# Patient Record
Sex: FEMALE | Race: WHITE
[De-identification: names, ages, dates, MRNs, and addresses within clinical notes are randomized per-mention and may not be internally consistent; named-entity substitution may affect disease eponyms.]

---

## 2019-03-12 ENCOUNTER — HOSPITAL ENCOUNTER (OUTPATIENT)
Dept: HOSPITAL 47 - WWCWWP | Age: 54
Discharge: HOME | End: 2019-03-12
Payer: COMMERCIAL

## 2019-03-12 VITALS
DIASTOLIC BLOOD PRESSURE: 72 MMHG | TEMPERATURE: 97.1 F | RESPIRATION RATE: 18 BRPM | HEART RATE: 81 BPM | SYSTOLIC BLOOD PRESSURE: 111 MMHG

## 2019-03-12 VITALS — BODY MASS INDEX: 21.4 KG/M2

## 2019-03-12 DIAGNOSIS — Z12.31: Primary | ICD-10-CM

## 2019-03-12 DIAGNOSIS — Z00.00: ICD-10-CM

## 2019-03-12 LAB
ALBUMIN SERPL-MCNC: 4.9 G/DL (ref 3.8–4.9)
ALBUMIN/GLOB SERPL: 1.96 G/DL (ref 1.6–3.17)
ALP SERPL-CCNC: 81 U/L (ref 41–126)
ALT SERPL-CCNC: 20 U/L (ref 8–44)
ANION GAP SERPL CALC-SCNC: 9.3 MMOL/L (ref 4–12)
AST SERPL-CCNC: 30 U/L (ref 13–35)
BUN SERPL-SCNC: 16 MG/DL (ref 9–27)
CALCIUM SPEC-MCNC: 9.7 MG/DL (ref 8.7–10.3)
CHLORIDE SERPL-SCNC: 103 MMOL/L (ref 96–109)
CHOLEST SERPL-MCNC: 188 MG/DL (ref 0–200)
CO2 SERPL-SCNC: 27.7 MMOL/L (ref 21.6–31.8)
ERYTHROCYTE [DISTWIDTH] IN BLOOD BY AUTOMATED COUNT: 4.64 M/UL (ref 3.8–5.4)
ERYTHROCYTE [DISTWIDTH] IN BLOOD: 11.9 % (ref 11.5–15.5)
GLOBULIN SER CALC-MCNC: 2.5 G/DL (ref 1.6–3.3)
GLUCOSE SERPL-MCNC: 77 MG/DL (ref 70–110)
HCT VFR BLD AUTO: 43.3 % (ref 34–46)
HGB BLD-MCNC: 13.8 GM/DL (ref 11.4–16)
MCH RBC QN AUTO: 29.7 PG (ref 25–35)
MCHC RBC AUTO-ENTMCNC: 31.9 G/DL (ref 31–37)
MCV RBC AUTO: 93.2 FL (ref 80–100)
PLATELET # BLD AUTO: 228 K/UL (ref 150–450)
POTASSIUM SERPL-SCNC: 4.1 MMOL/L (ref 3.5–5.5)
PROT SERPL-MCNC: 7.4 G/DL (ref 6.2–8.2)
SODIUM SERPL-SCNC: 140 MMOL/L (ref 135–145)
WBC # BLD AUTO: 6.4 K/UL (ref 3.8–10.6)

## 2019-03-12 PROCEDURE — 77067 SCR MAMMO BI INCL CAD: CPT

## 2019-03-12 PROCEDURE — 82465 ASSAY BLD/SERUM CHOLESTEROL: CPT

## 2019-03-12 PROCEDURE — 84443 ASSAY THYROID STIM HORMONE: CPT

## 2019-03-12 PROCEDURE — 36415 COLL VENOUS BLD VENIPUNCTURE: CPT

## 2019-03-12 PROCEDURE — 85027 COMPLETE CBC AUTOMATED: CPT

## 2019-03-12 PROCEDURE — 80053 COMPREHEN METABOLIC PANEL: CPT

## 2019-03-12 PROCEDURE — 84439 ASSAY OF FREE THYROXINE: CPT

## 2019-03-12 NOTE — P.HPOB
History of Present Illness


H&P Date: 19


Chief Complaint: The patient is here for her routine gynecologic exam.


This is a 54-year-old  with an LMP of 2017.  The patient is 

here to establish with this office.  Her  is  status post vasectomy.  The

patient states that has been about 10 years since her last pelvic exam.  She 

does have occasional hot flashes which seem to come and go.  They are not very 

bothersome.  She states that drinking dot tea seems to help.  She is without 

gynecologic complaints.  She currently does not have a primary care physician 

and is requesting screening blood work.








Review of Systems


She believes she has gained about 6 pounds over the last year.  She previously 

was a runner but has not been able to do this because of back issues.  She 

believes this is why she has gained some weight.  She denies respiratory, 

cardiac, or G.I. problems.








Past Medical History


Additional Past Medical History / Comment(s): Chronic back issues including 

herniated and bulging discs. PAST GYN HISTORY: She has no history of STDs.


History of Any Multi-Drug Resistant Organisms: None Reported


Past Surgical History: Adenoidectomy, Breast Surgery


Additional Past Surgical History / Comment(s): Benign breast lump removed.  

[place] teeth removed.  VTP.  Colonoscopy .


Past Psychological History: No Psychological Hx Reported


Smoking Status: Never smoker


Past Alcohol Use History: Occasional (4 per month)


Past Drug Use History: None Reported


Additional History: She is been  since .  She works in the probation 

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





- Past Family History


  ** Brother(s)


Family Medical History: Cancer


Additional Family Medical History / Comment(s): Prostate cancer.





  ** Sister(s)


Family Medical History: Cancer


Additional Family Medical History / Comment(s): Non-Hodgkin's lymphoma.





  ** Father


Family Medical History: Myocardial Infarction (MI)





  ** Mother


Family Medical History: No Reported History


Additional Family Medical History / Comment(s): Maternal grandmother had breast 

cancer.





Medications and Allergies


                                Home Medications











 Medication  Instructions  Recorded  Confirmed  Type


 


Cholecalciferol (Vitamin D3)  19  History





[Vitamin D3]    


 


Multivitamin [Multivitamins Adult 1 each PO 19  History





Gummies]    








                                    Allergies











Allergy/AdvReac Type Severity Reaction Status Date / Time


 


Anesthetics - Amide Type AdvReac  Vomiting Unverified 19 08:51














Exam


                                   Vital Signs











  Temp Pulse Resp BP Pulse Ox


 


 19 08:45  97.1 F L  81  18  111/72  99








                                Intake and Output











 19





 22:59 06:59 14:59


 


Other:   


 


  Weight   56.699 kg











Height 5'4", weight 125 pounds, BMI 21.5.





This is a well-developed well-nourished white female who is alert and oriented 

times 3 in no acute distress.


HEENT: Within normal limits.


NECK: Supple without mass or thyromegaly.


CHEST AND LUNGS: Clear to auscultation.


HEART: Regular rate and rhythm.


BREASTS: Are without mass or discharge.


AXILLARY EXAM: Negative for adenopathy.


BACK: Negative for CVA tenderness.


ABDOMEN: Soft, nontender, without palpable masses.


PELVIC EXAM: Normal external genitalia. Cervix and vagina appear normal with 

mild atrophy.  The service is nulliparous and slightly stenotic.  There is no 

unusual discharge.  There is no evidence of prolapse.  The uterus is 

midposition, nongravid size and nontender. There are no palpable adnexal masses 

or tenderness.


RECTAL EXAM: rectovaginal exam is negative for mass or tenderness and is 

negative for occult blood.


EXTREMITIES: Nontender.





IMPRESSION: 


1.  54-year-old menopausal female with normal gynecologic exam.





PLAN: 


1.  Pap smear was performed.


2.  Self breast awareness was discussed with the patient.


3.  Screening mammogram will be done today.  I have recommended that she do this

yearly.


4.  Osteoporosis prevention was discussed.  I have stressed the importance of 

adequate calcium, vitamin D and regular exercise.  Recommended amounts of 

calcium and vitamin D were also discussed.


5.  The patient is requesting screening blood tests.  The following will be 

drawn today: TSH, comprehensive chem panel, cholesterol, and CBC.  I have 

recommended that she established with a primary care physician.  She understands

that if there are abnormalities in today's blood work, I may suggest that she 

follow up with a primary care physician.


6.  I have recommended screening colonoscopy since it is been more than 10 years

since her last one.  She previously saw Dr. Morris for this.  I have recommended 

that she contact Dr. Morris's office for the screening colonoscopy.


7.  She was advised to return in one year for her annual well woman exam.

## 2019-03-13 NOTE — MM
Reason for exam: screening  (asymptomatic).

Last mammogram was performed 5 years ago.



History:

Patient is postmenopausal and is nulliparous.

Family history of breast cancer in maternal grandmother at age 45.

Benign excisional biopsy of the right breast, 2008.

Took hormonal contraceptives for 20 years beginning at age 18.



Physical Findings:

A clinical breast exam by your physician is recommended on an annual basis and 

results should be correlated with mammographic findings.



MG Screening Mammo w CAD

Bilateral CC and MLO view(s) were taken.

Prior study comparison: February 28, 2014, bilateral digital screening mammo 

w/CAD.  September 30, 2011, CAD bilateral diagnostic mammogram.

The breast tissue is extremely dense which could obscure a lesion on mammography. 

There are benign appearing round calcifications bilaterally. There is no discrete

abnormality.





ASSESSMENT: Benign, BI-RAD 2



RECOMMENDATION:

Routine screening mammogram of both breasts in 1 year.

## 2019-06-10 ENCOUNTER — HOSPITAL ENCOUNTER (OUTPATIENT)
Dept: HOSPITAL 47 - LABWHC1 | Age: 54
Discharge: HOME | End: 2019-06-10
Attending: INTERNAL MEDICINE
Payer: COMMERCIAL

## 2019-06-10 DIAGNOSIS — E03.8: Primary | ICD-10-CM

## 2019-06-10 LAB — T4 FREE SERPL-MCNC: 1.1 NG/DL (ref 0.8–1.8)

## 2019-06-10 PROCEDURE — 36415 COLL VENOUS BLD VENIPUNCTURE: CPT

## 2019-06-10 PROCEDURE — 86376 MICROSOMAL ANTIBODY EACH: CPT

## 2019-06-10 PROCEDURE — 84443 ASSAY THYROID STIM HORMONE: CPT

## 2019-06-10 PROCEDURE — 84439 ASSAY OF FREE THYROXINE: CPT

## 2019-07-22 ENCOUNTER — HOSPITAL ENCOUNTER (OUTPATIENT)
Dept: HOSPITAL 47 - LABWHC1 | Age: 54
Discharge: HOME | End: 2019-07-22
Attending: INTERNAL MEDICINE
Payer: COMMERCIAL

## 2019-07-22 DIAGNOSIS — E03.8: Primary | ICD-10-CM

## 2019-07-22 PROCEDURE — 36415 COLL VENOUS BLD VENIPUNCTURE: CPT

## 2019-07-22 PROCEDURE — 84443 ASSAY THYROID STIM HORMONE: CPT

## 2020-01-21 ENCOUNTER — HOSPITAL ENCOUNTER (OUTPATIENT)
Dept: HOSPITAL 47 - LABWHC1 | Age: 55
Discharge: HOME | End: 2020-01-21
Attending: INTERNAL MEDICINE
Payer: COMMERCIAL

## 2020-01-21 DIAGNOSIS — E03.8: Primary | ICD-10-CM

## 2020-01-21 PROCEDURE — 84443 ASSAY THYROID STIM HORMONE: CPT

## 2020-01-21 PROCEDURE — 36415 COLL VENOUS BLD VENIPUNCTURE: CPT

## 2020-03-03 ENCOUNTER — HOSPITAL ENCOUNTER (OUTPATIENT)
Dept: HOSPITAL 47 - RADMRIMAIN | Age: 55
Discharge: HOME | End: 2020-03-03
Attending: OTOLARYNGOLOGY
Payer: COMMERCIAL

## 2020-03-03 DIAGNOSIS — H90.42: Primary | ICD-10-CM

## 2020-03-03 DIAGNOSIS — H93.12: ICD-10-CM

## 2020-03-03 DIAGNOSIS — H92.02: ICD-10-CM

## 2020-03-03 DIAGNOSIS — H93.3X9: ICD-10-CM

## 2020-03-03 DIAGNOSIS — R42: ICD-10-CM

## 2020-03-03 PROCEDURE — 70553 MRI BRAIN STEM W/O & W/DYE: CPT

## 2020-03-03 NOTE — MR
EXAMINATION TYPE: MR brain and iac wo/w con

 

DATE OF EXAM: 3/3/2020

 

COMPARISON: None

 

HISTORY: Tinnitus

 

TECHNIQUE: 

Multiplanar, multisequence images of the brain and brainstem is performed without and with IV contras
t, utilizing 5.5 mL intravenous Gadavist .

 

FINDINGS: Diffusion weighted images demonstrate no evidence of a recent infarct or other diffusion ab
normality.  

 

Midline structures demonstrate normal morphology.  The craniocervical junction appears within normal 
limits.  Post contrast images demonstrate no abnormal enhancement. The dural venous sinuses appear pa
tent.

 

Mastoid air cells are clear. Nasal septal deviation seen with no significant changes of sinusitis. Or
bital structures are symmetric. Prominent blood vessel seen along the bob on the left. There is cont
iguous with the posterior cerebral artery and may empty into the dural venous sinus.

 

IMPRESSION: 

1. No evidence of cerebellopontine angle mass or acoustic schwannoma.

2. CTA [place] with contrast recommended to exclude tiny dural arteriovenous fistula

## 2020-03-18 ENCOUNTER — HOSPITAL ENCOUNTER (OUTPATIENT)
Dept: HOSPITAL 47 - RADCTMAIN | Age: 55
Discharge: HOME | End: 2020-03-18
Attending: OTOLARYNGOLOGY
Payer: COMMERCIAL

## 2020-03-18 DIAGNOSIS — H90.42: ICD-10-CM

## 2020-03-18 DIAGNOSIS — I77.0: ICD-10-CM

## 2020-03-18 DIAGNOSIS — H92.02: Primary | ICD-10-CM

## 2020-03-18 DIAGNOSIS — H81.11: ICD-10-CM

## 2020-03-18 PROCEDURE — 70496 CT ANGIOGRAPHY HEAD: CPT

## 2020-03-18 NOTE — CT
EXAMINATION TYPE: CT angio COW [place]

 

DATE OF EXAM: 3/18/2020 9:57 AM

 

COMPARISON: MRI brain March 3, 2020.

 

HISTORY: Hearing Loss, Otalgia

 

CT DLP: 1234.2 mGycm

Automated exposure control for dose reduction was used.

 

TECHNIQUE: 

Performed without and with IV Contrast, patient injected with 100 mL of Isovue 370.

3D reconstructed images are created on an independent workstation and reviewed.. 

 

FINDINGS: 

Noncontrast CT shows no acute intracranial hemorrhage or midline shift. Ventricles and sulci are with
in normal limits in size. Gray-white matter differentiation is maintained. Visualized sinuses are m[place]
ar and the globes are intact bilaterally. No suspicious opacification mastoid air cells.

 

There is dominant right vertebral artery. Vertebral arteries are patent basilar junction. There are p
atent bilateral posterior communicating arteries. There is no significant focal stenosis or aneurysma
l change of the posterior circulation. Asymmetric prominent left sided peripontine vessel on MRI show
s symmetric appearance on today's study with symmetric smaller branching vessels identified bilateral
ly. Findings presumed benign.

 

There is patent anterior communicating artery. There is no significant focal stenosis or aneurysmal c
hange in the anterior circulation.

 

IMPRESSION: Unremarkable study.

## 2022-07-13 ENCOUNTER — HOSPITAL ENCOUNTER (OUTPATIENT)
Dept: HOSPITAL 47 - WWCWWP | Age: 57
End: 2022-07-13
Attending: OBSTETRICS & GYNECOLOGY
Payer: COMMERCIAL

## 2022-07-13 VITALS
DIASTOLIC BLOOD PRESSURE: 80 MMHG | SYSTOLIC BLOOD PRESSURE: 114 MMHG | HEART RATE: 69 BPM | TEMPERATURE: 97.9 F | RESPIRATION RATE: 17 BRPM

## 2022-07-13 DIAGNOSIS — N94.10: ICD-10-CM

## 2022-07-13 DIAGNOSIS — E03.9: ICD-10-CM

## 2022-07-13 DIAGNOSIS — N93.9: ICD-10-CM

## 2022-07-13 DIAGNOSIS — Z79.890: ICD-10-CM

## 2022-07-13 DIAGNOSIS — Z12.31: ICD-10-CM

## 2022-07-13 DIAGNOSIS — Z78.0: ICD-10-CM

## 2022-07-13 DIAGNOSIS — Z88.4: ICD-10-CM

## 2022-07-13 DIAGNOSIS — Z01.419: Primary | ICD-10-CM

## 2022-07-13 PROCEDURE — 77063 BREAST TOMOSYNTHESIS BI: CPT

## 2022-07-13 PROCEDURE — 77067 SCR MAMMO BI INCL CAD: CPT

## 2022-07-13 NOTE — P.HPOB
History of Present Illness


H&P Date: 22


Chief Complaint: The patient is here for her routine gynecologic exam and ma

mmogram.





This is a 57-year-old  with an LMP of 2017.  The patient states she has 

been experiencing significant discomfort with sexual intercourse.  She states it

feels very dry and sometimes feels like there are sharp knives in her vagina.  

She had an episode where she had some bleeding after intercourse several weeks 

ago.  She has tried over-the-counter lubricants without much improvement.  She 

is otherwise without complaints.





Review of Systems





The patient has lost 13 pounds over the last 3 years.  She attributes the weight

loss to stress.  She denies respiratory, cardiac, or G.I. problems.





Past Medical History


Past Medical History: Thyroid Disorder


Additional Past Medical History / Comment(s): Hypothyroidism.  Chronic back 

issues including herniated and bulging discs. PAST GYN HISTORY: She has no 

history of STDs.


History of Any Multi-Drug Resistant Organisms: None Reported


Past Surgical History: Adenoidectomy, Breast Surgery


Additional Past Surgical History / Comment(s): Benign breast lump removed.  

[place] teeth removed.  VTP.  Colonoscopy .


Past Psychological History: No Psychological Hx Reported


Smoking Status: Never smoker


Past Alcohol Use History: Occasional (0-4 per month)


Past Drug Use History: None Reported


Additional History: She has been  since .  She works in the probation

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





- Past Family History


  ** Brother(s)


Family Medical History: Cancer


Additional Family Medical History / Comment(s): Prostate cancer.





  ** Sister(s)


Family Medical History: Cancer


Additional Family Medical History / Comment(s): Non-Hodgkin's lymphoma.





  ** Father


Family Medical History: Myocardial Infarction (MI)





  ** Mother


Family Medical History: No Reported History


Additional Family Medical History / Comment(s): Maternal grandmother had breast 

cancer.





Medications and Allergies


                                Home Medications











 Medication  Instructions  Recorded  Confirmed  Type


 


Cholecalciferol (Vitamin D3) 1 tab PO DAILY 19 History





[Vitamin D3]    


 


Multivitamin [Multivitamins Adult 1 each PO DAILY 19 History





Gummies]    


 


Thyroid, Pork [[place] Thyroid] 30 mg PO DAILY 22 History








                                    Allergies











Allergy/AdvReac Type Severity Reaction Status Date / Time


 


Anesthetics - Amide Type - AdvReac  Vomiting Unverified 22 11:32





Select A     





[Anesthetics - Amide Type]     














Exam


                                   Vital Signs











  Temp Pulse Resp BP Pulse Ox


 


 22 11:33  97.9 F  69  17  114/80  100








                                Intake and Output











 22





 22:59 06:59 14:59


 


Other:   


 


  Weight   50.802 kg














Height 5 feet 5 inches, weight 112 pounds, BMI 18.6.





This is a well-developed well-nourished white female who is alert and oriented 

times 3 in no acute distress.


HEENT: Within normal limits.


NECK: Supple without mass or thyromegaly.


CHEST AND LUNGS: Clear to auscultation.


HEART: Regular rate and rhythm.


BREASTS: Are without mass or discharge.


AXILLARY EXAM: Negative for adenopathy.


BACK: Negative for CVA tenderness.


ABDOMEN: Soft, nontender, without palpable masses.


PELVIC EXAM: Normal external genitalia with mild atrophy. Cervix and vagina 

appear normal with mild to moderate atrophy.  The cervix appears nulliparous and

is somewhat stenotic secondary to atrophy. There is no unusual discharge.  There

is no evidence of prolapse.  The uterus is midposition, nongravid size and nonte

nder. There are no palpable adnexal masses or tenderness.


RECTAL EXAM: Rectovaginal exam is negative for mass or tenderness and is 

negative for occult blood.


EXTREMITIES: Nontender.





IMPRESSION: 


1.  57-year-old menopausal female with normal gynecologic exam.


2.  Dyspareunia secondary to vaginal dryness and genital atrophy.


3.  Episode of postcoital vaginal bleeding probably secondary to #2.  Post

menopausal uterine bleeding is less likely.





PLAN: 


1.  Pap smear cotest was performed.


2.  Self breast awareness was discussed with the patient.  We have also 

discussed symptoms associated with inflammatory breast cancer.


3.  Screening mammogram will be done today.


4.  I have recommended pelvic ultrasound to evaluate endometrial thickness to 

rule out endometrial neoplasia.  If endometrial thickness is normal, I will 

prescribe estradiol vaginal cream to see if this helps with her dyspareunia and 

vaginal dryness.


5.  Osteoporosis prevention was discussed.  I have stressed the importance of 

adequate calcium, vitamin D and regular exercise.  Recommended amounts of 

calcium and vitamin D were also discussed.  She states her mother has 

osteoporosis so I have recommended starting bone density testing prior to age 

60.  She would like to wait until next year and do it at the time of her annual 

well woman visit.


6.  She is due for a colonoscopy since it has been more than 10 years since her 

last one.  She will arrange this through her PCP.


7. She was advised to return in one year for her annual well woman exam and as 

needed.

## 2022-07-15 NOTE — MM
Reason for Exam: Screening  (asymptomatic). 

Last mammogram was performed 3 year(s) and 4 month(s) ago. 





Patient History: 

Menarche at age 14. Patient has no children. Postmenopausal. Hormonal Contraceptives for 20 years

from age 18 until age 38. 2008, Benign Excisional Biopsy on the right side.

Maternal grandmother had breast cancer, age 45. 





Risk Values: 

Zohra 5 year model risk: 1.6%.

NCI Lifetime model risk: 10.4%.





Prior Study Comparison: 

9/30/2011 Bilateral Diagnostic Mammogram, [facility]. 2/28/2014 Bilateral Screening Mammogram, [facility].

3/12/2019 Bilateral Screening Mammogram, [facility]. 





Tissue Density: 

The breast tissue is extremely dense which could obscure a lesion on mammography.





Findings: 

Analyzed By CAD. 

There is no suspicious group of microcalcifications or new suspicious mass in either breast.

Benign-appearing scattered round calcifications. 





Overall Assessment: Benign, BI-RAD 2





Management: 

Screening Mammogram of both breasts in 1 year.

A clinical breast exam by your physician is recommended on an annual basis and results should be

correlated with mammographic findings.



Electronically signed and approved by: Chu Jaramillo D.O.

## 2022-07-27 ENCOUNTER — HOSPITAL ENCOUNTER (OUTPATIENT)
Dept: HOSPITAL 47 - RADUSWWP | Age: 57
Discharge: HOME | End: 2022-07-27
Attending: OBSTETRICS & GYNECOLOGY
Payer: COMMERCIAL

## 2022-07-27 DIAGNOSIS — N95.0: Primary | ICD-10-CM

## 2022-07-27 PROCEDURE — 76830 TRANSVAGINAL US NON-OB: CPT

## 2022-07-27 NOTE — US
EXAMINATION TYPE: US transvaginal

 

DATE OF EXAM: 7/27/2022

 

COMPARISON: NONE

 

CLINICAL HISTORY: N95.0 Postmenopausal bleeding. spotting after intercourse for a few months

 

TECHNIQUE:  TVTransvaginal sonographic images 

Date of LMP:  6yrs ago

 

EXAM MEASUREMENTS:

 

Uterus:  6.2 x 3.5 x 2.5 cm

Endometrial Stripe: 0.3 cm

Right Ovary:  not seen 

Left Ovary:  1.6 x 1.0 x 1.2 cm

 

 

 

1. Uterus:  Anteverted   filled collection with debris seen within cervical canal

2. Endometrium:  wnl

3. Right Ovary:  not seen due to peristalsing bowel and or atrophy 

4. Left Ovary:  wnl

5. Bilateral Adnexa:  wnl

6. Posterior cul-de-sac:  wnl

 

Urinary bladder is visualized appears sonolucent.

 

IMPRESSION: 

1. Some mild fluid debris may be within the cervical endometrial canal. Differential diagnosis includ
e hemorrhage.

2. Nonvisualization of the right ovary

## 2023-07-06 ENCOUNTER — HOSPITAL ENCOUNTER (OUTPATIENT)
Dept: HOSPITAL 47 - RADUSWWP | Age: 58
Discharge: HOME | End: 2023-07-06
Attending: FAMILY MEDICINE
Payer: COMMERCIAL

## 2023-07-06 DIAGNOSIS — E04.1: Primary | ICD-10-CM

## 2023-07-06 PROCEDURE — 76536 US EXAM OF HEAD AND NECK: CPT

## 2023-07-06 NOTE — US
EXAMINATION TYPE: US thyroid st tissue head/neck

 

DATE OF EXAM: 7/6/2023

 

COMPARISON: NONE

 

CLINICAL INDICATION: Female, 58 years old with history of E04.1 NONTOXIC SINGLE THYROID NODULE; Patie
nt states doctor felt a nodule.

 

GLAND SIZE:

 

Right Lobe: 4.5 x 1.4 x 1.4 cm

** Overall Parenchyma:  homogenous

Left Lobe: 2.8 x 0.8 x 0.8 cm

** Overall Parenchyma:  homogeneous

Isthmus Thickness:  0.1 cm

 

NODULES

 

RIGHT:   # of nodules measured on right: 0

 

LEFT:    # of nodules measured on left:  0

 

ISTHMUS:    # of nodules measured in the isthmus:  0

 

Bilateral neck scanned, no evidence of lymphadenopathy.

 

 

 

IMPRESSION:

Unremarkable thyroid ultrasound without ultrasound evidence for discrete nodule.

## 2023-07-26 ENCOUNTER — HOSPITAL ENCOUNTER (OUTPATIENT)
Dept: HOSPITAL 47 - RADMAMWWP | Age: 58
Discharge: HOME | End: 2023-07-26
Attending: FAMILY MEDICINE
Payer: COMMERCIAL

## 2023-07-26 DIAGNOSIS — Z80.3: ICD-10-CM

## 2023-07-26 DIAGNOSIS — Z78.0: ICD-10-CM

## 2023-07-26 DIAGNOSIS — M85.89: ICD-10-CM

## 2023-07-26 DIAGNOSIS — Z12.31: Primary | ICD-10-CM

## 2023-07-26 PROCEDURE — 77080 DXA BONE DENSITY AXIAL: CPT

## 2023-07-26 PROCEDURE — 77067 SCR MAMMO BI INCL CAD: CPT

## 2023-07-26 PROCEDURE — 77063 BREAST TOMOSYNTHESIS BI: CPT

## 2023-07-26 NOTE — BD
EXAMINATION TYPE: Axial Bone Density

 

DATE OF EXAM: 2023

 

CLINICAL HISTORY: 58 years old Female.  ICD-10 CODE:  N95.1 DISORDER OF BONE

 

Height:  64 in

Weight:  111 lbs

 

 

 

RISK FACTORS 

HISTORY OF: 

Family History of Osteoporosis: yes mother

Active: yes

Diet low in dairy products/other sources of calcium:  yes

Postmenopausal woman: age 51

 

MEDICATIONS: 

Thyroid Medications:  yes

Which medication: Synthroid

How Lon month

Additional Medications: calcium, vit d, vit b,  

 

 

 

EXAM MEASUREMENTS: 

Bone mineral densitometry was performed using the Viroblock System.

Bone mineral density as measured about the Lumbar spine is:  

----- L1-L4(G/cm2): 0.982

T Score Values are as follows:

----- L1: -1.2

----- L2: -1.5

----- L3: -1.2

----- L4: -2.6

----- L1-L4: -1.7

 

Z Score Values are as follows:

----- L1: 0.3

----- L2: 0.0

----- L3: 0.3

----- L4: -1.1

----- L1-L4: -0.2

 

Bone mineral density baseline

 

Bone mineral density about the R hip (g/cm2): 0.908

Bone mineral density about the L hip (g/cm2): 0.874

T Score values are as follows:

-----R Neck: -0.9

-----L Neck: -1.1

-----R Total: -0.8

-----L Total: -1.1

 

Z Score values are as follows:

-----R Neck: 0.5

-----L Neck: 0.4

-----R Total: 0.3

-----L Total: 0.1

 

Bone mineral density baseline

 

 

FRAX%s: The graph provided illustrates a 5.5% chance for a major osteoporotic fx and a 0.3% chance fo
r the hips probability for fx in 10 years time.

 

 

 

 

IMPRESSION:

Osteopenia (T Score between -2.5 and -1).

 

There is slightly increased risk of fracture and the patient may be considered 

for treatment. 

 

Re-Screen 2-5 years.

 

NOTE:  T-SCORE=SD OF THE YOUNG ADULT MEAN.

## 2023-07-27 NOTE — MM
Reason for Exam: Screening  (asymptomatic). 

Last screening mammogram was performed 12 month(s) ago.





Patient History: 

Menarche at age 14. Patient has no children. Postmenopausal. Hormonal Contraceptives for 20 years

from age 18 until age 38. 2008, Benign Excisional Biopsy on the right side.

Maternal grandmother had breast cancer, age 45. 





Risk Values: 

Zohra 5 year model risk: 1.7%.

NCI Lifetime model risk: 10.2%.





Prior Study Comparison: 

2/28/2014 Bilateral Screening Mammogram, [facility]. 3/12/2019 Bilateral Screening Mammogram, [facility].

7/13/2022 Bilateral MG 3D screening mammo w/cad, [facility]. 





Tissue Density: 

The breast tissue is heterogeneously dense. This may lower the sensitivity of mammography.





Findings: 

Analyzed By CAD. 

Unchanged regional microcalcifications posterior upper outer quadrant left breast.

There is no suspicious group of microcalcifications or new suspicious mass in either breast. 





Overall Assessment: Benign, BI-RAD 2





Management: 

Screening Mammogram of both breasts in 1 year.

.



Patient should continue monthly self-breast exams.  A clinical breast exam by your physician is

recommended on an annual basis.

This exam should not preclude additional follow-up of suspicious palpable abnormalities.



Note on Zohra scores and lifetime risk:

1. A Zohra score greater than 3% is considered moderate risk. If this is the case, consider

specialist referral to assess eligibility for a risk reducing agent.

2. If overall lifetime risk for the development of breast cancer is 20% or higher, the patient may

qualify for future screening with alternating mammogram and breast MRI.



Electronically signed and approved by: Ash Rene DO

## 2023-10-17 ENCOUNTER — HOSPITAL ENCOUNTER (OUTPATIENT)
Dept: HOSPITAL 47 - ORWHC2ENDO | Age: 58
Discharge: HOME | End: 2023-10-17
Attending: SURGERY
Payer: COMMERCIAL

## 2023-10-17 VITALS — TEMPERATURE: 97.6 F

## 2023-10-17 VITALS — BODY MASS INDEX: 20.2 KG/M2

## 2023-10-17 VITALS — HEART RATE: 72 BPM | DIASTOLIC BLOOD PRESSURE: 73 MMHG | SYSTOLIC BLOOD PRESSURE: 128 MMHG

## 2023-10-17 VITALS — RESPIRATION RATE: 16 BRPM

## 2023-10-17 DIAGNOSIS — Z79.890: ICD-10-CM

## 2023-10-17 DIAGNOSIS — E07.9: ICD-10-CM

## 2023-10-17 DIAGNOSIS — Z12.11: Primary | ICD-10-CM

## 2023-10-17 DIAGNOSIS — K63.5: ICD-10-CM

## 2023-10-17 DIAGNOSIS — Z98.890: ICD-10-CM

## 2023-10-17 DIAGNOSIS — Z88.4: ICD-10-CM

## 2023-10-17 PROCEDURE — 45385 COLONOSCOPY W/LESION REMOVAL: CPT

## 2023-10-17 PROCEDURE — 88305 TISSUE EXAM BY PATHOLOGIST: CPT

## 2023-10-17 NOTE — P.PCN
Date of Procedure: 10/17/23


Procedure(s) Performed: 


PREOPERATIVE DIAGNOSIS: Colon cancer screening


POSTOPERATIVE DIAGNOSIS: Small colon polyps


PROCEDURE: Colonoscopy with snare polypectomy


ANESTHESIA: MAC


SURGEON: Dylan Morris M.D.


SPECIMENS: Cecal polyp


ENDOSCOPIC PROCEDURE:  The patient was placed on the endoscopy table in the left

decubitus position.  The Olympus colonoscope was inserted into the anus and 

passed under direct visualization to the base of the cecum.  The appendiceal 

orifice was visualized.  From that point the scope was slowly withdrawn 

inspecting all surfaces carefully.  There was a small polyp seen on the cecal 

valve.  This was removed using the snare without cautery technique.  The 

remainder of the ascending transverse colon appeared normal.  In the descending 

colon another small polyp was seen and removed in a similar fashion.  This was 

so small that no specimen was identified.  The remainder of the descending 

sigmoid and rectum appeared normal.  There was no visible diverticulosis.  

Digital rectal examination was normal.  The patient was taken to the recovery 

room in stable condition per anesthesia guidelines.


RECOMMENDATIONS: Await biopsy results.  Repeat colonoscopy in 5-7 years.

## 2023-10-17 NOTE — P.GSHP
History of Present Illness


H&P Date: 10/17/23


Chief Complaint: Colon cancer screening





58-year-old female here for colonoscopy.  Last colonoscopy 15 years ago.  No 

bowel complaints.  No family history of colon cancer.





Past Medical History


Past Medical History: Thyroid Disorder


Additional Past Medical History / Comment(s): Hypothyroidism.  Chronic back 

issues including herniated and bulging discs. PAST GYN HISTORY: She has no 

history of STDs.  blood clot to brain left side watching it


History of Any Multi-Drug Resistant Organisms: None Reported


Past Surgical History: Adenoidectomy, Breast Surgery


Additional Past Surgical History / Comment(s): Benign breast lump removed.  

[place] teeth removed.  VTP.  Colonoscopy 2008.


Past Anesthesia/Blood Transfusion Reactions: Previous Problems w/ Anesthesia, 

Postoperative Nausea & Vomiting (PONV)


Additional Past Anesthesia/Blood Transfusion Reaction / Comment(s): no blood 

transfusion


Smoking Status: Never smoker





- Past Family History


  ** Brother(s)


Family Medical History: Cancer


Additional Family Medical History / Comment(s): Prostate cancer.





  ** Sister(s)


Family Medical History: Cancer


Additional Family Medical History / Comment(s): Non-Hodgkin's lymphoma.





  ** Father


Family Medical History: Myocardial Infarction (MI)





  ** Mother


Family Medical History: No Reported History


Additional Family Medical History / Comment(s): Maternal grandmother had breast 

cancer.





Medications and Allergies


                                Home Medications











 Medication  Instructions  Recorded  Confirmed  Type


 


Cholecalciferol (Vitamin D3) 1 tab PO DAILY 03/12/19 10/17/23 History





[Vitamin D3]    


 


Multivitamin [Multivitamins Adult 1 each PO DAILY 03/12/19 10/17/23 History





Gummies]    


 


Thyroid, Pork [[place] Thyroid] 25 mg PO DAILY 07/13/22 10/17/23 History


 


Estrogens, Conjugated Cream 1 gram VAGINAL AS DIRECTED #42.5 gm 08/02/22 

10/17/23 Rx





[Premarin Vaginal Cream]    








                                    Allergies











Allergy/AdvReac Type Severity Reaction Status Date / Time


 


Anesthetics - Amide Type - AdvReac  Vomiting Verified 10/17/23 10:24





Select A     





[Anesthetics - Amide Type]     














Surgical - Exam


                                   Vital Signs











Temp Pulse Resp BP Pulse Ox


 


 97.6 F   77   18   130/81   100 


 


 10/17/23 10:23  10/17/23 10:23  10/17/23 10:23  10/17/23 10:23  10/17/23 10:23

















Physical exam:


General: Well-developed, well-nourished


HEENT: Normocephalic, sclerae nonicteric


Abdomen: Nontender, nondistended


Extremities: No edema


Neuro: Alert and oriented





Assessment and Plan


(1) Colon cancer screening


Narrative/Plan: 


Will proceed with colonoscopy at this time


Current Visit: Yes   Status: Acute   Code(s): Z12.11 - ENCOUNTER FOR SCREENING 

FOR MALIGNANT NEOPLASM OF COLON   SNOMED Code(s): 020037579

## 2024-11-05 ENCOUNTER — HOSPITAL ENCOUNTER (OUTPATIENT)
Dept: HOSPITAL 47 - WWCWWP | Age: 59
End: 2024-11-05
Attending: OBSTETRICS & GYNECOLOGY
Payer: COMMERCIAL

## 2024-11-05 VITALS
HEART RATE: 69 BPM | TEMPERATURE: 97.8 F | SYSTOLIC BLOOD PRESSURE: 112 MMHG | RESPIRATION RATE: 16 BRPM | DIASTOLIC BLOOD PRESSURE: 79 MMHG

## 2024-11-05 DIAGNOSIS — Z88.4: ICD-10-CM

## 2024-11-05 DIAGNOSIS — R92.8: ICD-10-CM

## 2024-11-05 DIAGNOSIS — Z78.0: ICD-10-CM

## 2024-11-05 DIAGNOSIS — Z12.31: Primary | ICD-10-CM

## 2024-11-05 DIAGNOSIS — R92.343: ICD-10-CM

## 2024-11-05 DIAGNOSIS — Z80.3: ICD-10-CM

## 2024-11-05 DIAGNOSIS — M85.80: ICD-10-CM

## 2024-11-05 PROCEDURE — 77067 SCR MAMMO BI INCL CAD: CPT

## 2024-11-05 PROCEDURE — 77063 BREAST TOMOSYNTHESIS BI: CPT
